# Patient Record
(demographics unavailable — no encounter records)

---

## 2017-03-06 NOTE — OR
DATE OF SURGERY:  03/02/2017.

 

REFERRING PROVIDER:  Latasha Wiseman

 

PREOPERATIVE DIAGNOSES:  History of colon polyps.  Last colonoscopy was about 6

years ago per the patient report.  There is no known family history of colon

cancer.

 

POSTOPERATIVE DIAGNOSES:

1. Two small colon polyps removed at 60 cm from the anal verge.

    a.     4 x 2 mm polyp.

    b.     2 mm polyp.

2. Questionable very mild colitis/inflammation of the left or descending

    colon.  Cold biopsy is taken from the area.

3. Tortuous colon.

 

PROCEDURE:  Colonoscopy with polypectomy x2 using cold forceps and cold biopsy

x1 area of the left colon using cold forceps.

 

SURGEON:  Pierre Wolf M.D.

 

ANESTHESIA:  Monitored anesthesia care.

 

BOWEL PREP:  Good.

 

DESCRIPTION OF PROCEDURE:  Ronda is a 60-year-old female who was brought to the

endoscopy suite after discussing risks and benefits of the procedure.  Informed

consent was obtained for conscious sedation and colonoscopy with or without

biopsy and/or polypectomy.  We also discussed possibility of missed lesions.

Pre-procedure exam was unremarkable.  IV, oxygen, and monitors were placed.  The

patient was placed in the left lateral decubitus position.  Sedation was

administered and a digital rectal exam was performed which was unremarkable.

Colonoscope was passed into the rectum and slowly advanced all the way to the

cecum.  Cecum was viewed and photographed.  The colonoscope was slowly withdrawn

and the mucosa was closed observed in a direct circumferential manner.  The

ascending colon was unremarkable.  The transverse colon was remarkable for 2

small polyps at 60 cm from the anal verge.  One of these was 4 mm x 2 mm and the

other 2 mm, both removed with cold forceps.  The descending colon did reveal a

small stretch of questionable very mild inflammation or colitis.  Cold biopsies

taken using cold forceps.  The sigmoid colon was unremarkable.  Retroflexion was

performed and rectal mucosa was unremarkable.  The patient did have a rather

tortuous colon, which required multiple maneuvers, including turning the patient

on her back and using abdominal pressure.  Scope was removed.  The patient

tolerated the procedure well.  The patient was monitored until that baseline

status.  Discharge instructions were reviewed and the patient was discharged in

good condition.

 

COMPLICATIONS:  None.

 

TOTAL TIME:  38 minutes.

 

ESTIMATED BLOOD LOSS:  About 1 mL.

 

RECOMMENDATIONS/FOLLOWUP:  We will await results of path report to determine

ideal followup interval.  I would like to kindly thank Carolyn Cordero D.O. and

Latasha Bowen, PAC for this referral.

 

 

DMB:  03/02/2017 09:46:34  MODL:  03/02/2017 17:38:20

Job #:  [id number]